# Patient Record
Sex: MALE | Race: WHITE | NOT HISPANIC OR LATINO | Employment: OTHER | URBAN - METROPOLITAN AREA
[De-identification: names, ages, dates, MRNs, and addresses within clinical notes are randomized per-mention and may not be internally consistent; named-entity substitution may affect disease eponyms.]

---

## 2024-02-20 ENCOUNTER — TELEPHONE (OUTPATIENT)
Dept: PRIMARY CARE | Facility: CLINIC | Age: 74
End: 2024-02-20

## 2024-02-20 NOTE — TELEPHONE ENCOUNTER
Patient due for an MCRA at any point. Schedule with [facility] or Dr. Pimentel. Please schedule patient and send this encounter to [facility] for lab orders.       Thank you-  Eliazar Feliz CMA  2/20/2024  Practice Supervisor  [facility]

## 2025-04-10 ENCOUNTER — OFFICE VISIT (OUTPATIENT)
Dept: PRIMARY CARE | Facility: CLINIC | Age: 75
End: 2025-04-10
Payer: MEDICARE

## 2025-04-10 VITALS
OXYGEN SATURATION: 98 % | HEIGHT: 70 IN | HEART RATE: 87 BPM | BODY MASS INDEX: 22.76 KG/M2 | DIASTOLIC BLOOD PRESSURE: 98 MMHG | SYSTOLIC BLOOD PRESSURE: 150 MMHG | WEIGHT: 159 LBS | TEMPERATURE: 97.4 F

## 2025-04-10 DIAGNOSIS — R11.2 NAUSEA AND VOMITING, UNSPECIFIED VOMITING TYPE: ICD-10-CM

## 2025-04-10 DIAGNOSIS — R10.13 EPIGASTRIC PAIN: Primary | ICD-10-CM

## 2025-04-10 DIAGNOSIS — K21.9 GASTROESOPHAGEAL REFLUX DISEASE, UNSPECIFIED WHETHER ESOPHAGITIS PRESENT: ICD-10-CM

## 2025-04-10 DIAGNOSIS — R11.0 NAUSEA: ICD-10-CM

## 2025-04-10 PROCEDURE — 99214 OFFICE O/P EST MOD 30 MIN: CPT | Performed by: FAMILY MEDICINE

## 2025-04-10 PROCEDURE — 1159F MED LIST DOCD IN RCRD: CPT | Performed by: FAMILY MEDICINE

## 2025-04-10 PROCEDURE — 93000 ELECTROCARDIOGRAM COMPLETE: CPT | Performed by: FAMILY MEDICINE

## 2025-04-10 PROCEDURE — 1036F TOBACCO NON-USER: CPT | Performed by: FAMILY MEDICINE

## 2025-04-10 RX ORDER — OMEPRAZOLE 20 MG/1
20 CAPSULE, DELAYED RELEASE ORAL
COMMUNITY

## 2025-04-10 ASSESSMENT — ENCOUNTER SYMPTOMS
COUGH: 0
BLOOD IN STOOL: 0
DEPRESSION: 0
OCCASIONAL FEELINGS OF UNSTEADINESS: 0
APPETITE CHANGE: 1
DIARRHEA: 0
SINUS PRESSURE: 0
APNEA: 0
DIFFICULTY URINATING: 0
UNEXPECTED WEIGHT CHANGE: 0
SORE THROAT: 0
LOSS OF SENSATION IN FEET: 0
POLYPHAGIA: 0
ACTIVITY CHANGE: 0
ABDOMINAL PAIN: 1
FACIAL SWELLING: 0
EYE REDNESS: 0
ABDOMINAL DISTENTION: 0
STRIDOR: 0
DIAPHORESIS: 0
ARTHRALGIAS: 0
EYE ITCHING: 0
FLANK PAIN: 0

## 2025-04-10 ASSESSMENT — PATIENT HEALTH QUESTIONNAIRE - PHQ9
10. IF YOU CHECKED OFF ANY PROBLEMS, HOW DIFFICULT HAVE THESE PROBLEMS MADE IT FOR YOU TO DO YOUR WORK, TAKE CARE OF THINGS AT HOME, OR GET ALONG WITH OTHER PEOPLE: VERY DIFFICULT
2. FEELING DOWN, DEPRESSED OR HOPELESS: SEVERAL DAYS
1. LITTLE INTEREST OR PLEASURE IN DOING THINGS: SEVERAL DAYS
SUM OF ALL RESPONSES TO PHQ9 QUESTIONS 1 AND 2: 2

## 2025-04-10 NOTE — PATIENT INSTRUCTIONS
Epigastric pain intractable nausea vomiting.  Referring to ER for evaluation concerned about possible ulcer possible obstruction possible pancreatitis.    I have commended following up with GI specialist.  This was recommended 2 years ago.  Will likely need to have them see you at the hospital.    EKG performed and reviewed no acute abnormalities noted.  Will notify ER of you coming over.    Reviewed EKG with you today.    Going to have you go to the emergency room for evaluation and will have your wife drive you.    I did reach out to the ER.

## 2025-04-10 NOTE — PROGRESS NOTES
Subjective   Patient ID: Lloyd Mixon is a 74 y.o. male who presents for Abdominal Pain (X 1 week ; nausea).    Patient presents having troubles with persistent nausea and epigastric discomfort.    Patient states this started about 6 to 7 days ago.  There is been no hematemesis but has had several bouts of emesis.  Has not been able to eat much occasionally can have some eggs and things of that nature.  Patient having water in may keep it down for about 2 hours or so and then may get back up.    He has had history of GE reflux.  States he is getting some heartburn now.    Patient with no diarrhea no blood in the stool or black tarry stool.    Patient has not episode of diverticulitis in the past he was to follow-up with GI specialist 2 years ago but he did not do so.  States he got busy with traveling and he is avoiding having any scoping performed.    Attending.  Patient has been contacted to follow-up with his visits.  Also contacted by to [facility] to follow-up on testing but he did not want to do this.            Abdominal Pain  Pertinent negatives include no arthralgias or diarrhea.    patient having issues of abdominal pain and nausea over the last week.    Review of Systems   Constitutional:  Positive for appetite change. Negative for activity change, diaphoresis and unexpected weight change.   HENT:  Negative for congestion, ear discharge, facial swelling, postnasal drip, sinus pressure and sore throat.    Eyes:  Negative for redness and itching.   Respiratory:  Negative for apnea, cough and stridor.    Cardiovascular:  Negative for leg swelling.   Gastrointestinal:  Positive for abdominal pain. Negative for abdominal distention, blood in stool and diarrhea.   Endocrine: Negative for heat intolerance and polyphagia.   Genitourinary:  Negative for difficulty urinating, flank pain and penile swelling.   Musculoskeletal:  Negative for arthralgias.       Objective   BP (!) 150/98   Pulse 87   Temp  "36.3 °C (97.4 °F)   Ht 1.765 m (5' 9.5\")   Wt 72.1 kg (159 lb)   SpO2 98%   BMI 23.14 kg/m²   BSA Body surface area is 1.88 meters squared.      Physical Exam  Constitutional:       Appearance: Normal appearance.   HENT:      Head: Normocephalic and atraumatic.      Right Ear: Tympanic membrane normal.      Left Ear: Tympanic membrane normal.      Nose: Nose normal.      Mouth/Throat:      Mouth: Mucous membranes are dry.   Eyes:      Comments: No jaundice noted   Cardiovascular:      Rate and Rhythm: Normal rate and regular rhythm.      Pulses: Normal pulses.      Heart sounds: Normal heart sounds.   Pulmonary:      Effort: Pulmonary effort is normal.      Breath sounds: Normal breath sounds.   Abdominal:      General: Abdomen is flat.      Tenderness: There is no abdominal tenderness. There is no guarding.      Comments: Some epigastric tenderness with deep palpation.   Musculoskeletal:      Cervical back: No rigidity.   Neurological:      Mental Status: He is alert.       No visits with results within 1 Year(s) from this visit.   Latest known visit with results is:   Legacy Encounter on 04/14/2022   Component Date Value Ref Range Status    Prostate Specific Antigen,Screen 04/14/2022 2.29  0.00 - 4.00 ng/mL Final    Comment: The FDA requires that the method used for PSA assay be   reported to the physician. Values obtained with different   assay methods must not be used interchangeably. This test   was performed at [facility] using the Siemens  [employer] PSA method, which is a sandwich immunoassay using   chemiluminescence for quantitation. The assay is approved  for measurement of prostate-specific antigen (PSA) in   serum and may be used in conjunction with a digital rectal  examination in men 50 years and older as an aid in   detection of prostate cancer.   5-Alpha-reductase inhibitors (e.g. Proscar, Finasteride,   Avodart, Dutasteride and Cortney) for the treatment of BPH   have been shown to " lower PSA levels by an average of 50%   after 6 months of treatment.      CRP 04/14/2022 0.35  mg/dL Final    Comment: REF VALUE  < 1.00      Cholesterol 04/14/2022 268 (H)  0 - 199 mg/dL Final    Comment: .      AGE      DESIRABLE   BORDERLINE HIGH   HIGH     0-19 Y     0 - 169       170 - 199     >/= 200    20-24 Y     0 - 189       190 - 224     >/= 225         >24 Y     0 - 199       200 - 239     >/= 240   **All ranges are based on fasting samples. Specific   therapeutic targets will vary based on patient-specific   cardiac risk.  .   Pediatric guidelines reference:Pediatrics 2011, 128(S5).   Adult guidelines reference: NCEP ATPIII Guidelines,     CHIP 2001, 258:2486-97  .   Venipuncture immediately after or during the    administration of Metamizole may lead to falsely   low results. Testing should be performed immediately   prior to Metamizole dosing.      HDL 04/14/2022 43.5  mg/dL Final    Comment: .      AGE      VERY LOW   LOW     NORMAL    HIGH       0-19 Y       < 35   < 40     40-45     ----    20-24 Y       ----   < 40       >45     ----      >24 Y       ----   < 40     40-60      >60  .      Cholesterol/HDL Ratio 04/14/2022 6.2 (A)   Final    Comment: REF VALUES  DESIRABLE  < 3.4  HIGH RISK  > 5.0      LDL 04/14/2022 176 (H)  0 - 99 mg/dL Final    Comment: .                           NEAR      BORD      AGE      DESIRABLE  OPTIMAL    HIGH     HIGH     VERY HIGH     0-19 Y     0 - 109     ---    110-129   >/= 130     ----    20-24 Y     0 - 119     ---    120-159   >/= 160     ----      >24 Y     0 -  99   100-129  130-159   160-189     >/=190  .      VLDL 04/14/2022 48 (H)  0 - 40 mg/dL Final    Triglycerides 04/14/2022 242 (H)  0 - 149 mg/dL Final    Comment: .      AGE      DESIRABLE   BORDERLINE HIGH   HIGH     VERY HIGH   0 D-90 D    19 - 174         ----         ----        ----  91 D- 9 Y     0 -  74        75 -  99     >/= 100      ----    10-19 Y     0 -  89        90 - 129     >/= 130       ----    20-24 Y     0 - 114       115 - 149     >/= 150      ----         >24 Y     0 - 149       150 - 199    200- 499    >/= 500  .   Venipuncture immediately after or during the    administration of Metamizole may lead to falsely   low results. Testing should be performed immediately   prior to Metamizole dosing.      Non HDL Cholesterol 04/14/2022 225  mg/dL Final    Comment:     AGE      DESIRABLE   BORDERLINE HIGH   HIGH     VERY HIGH     0-19 Y     0 - 119       120 - 144     >/= 145    >/= 160    20-24 Y     0 - 149       150 - 189     >/= 190      ----         >24 Y    30 MG/DL ABOVE LDL CHOLESTEROL GOAL  .      WBC 04/14/2022 6.6  4.4 - 11.3 x10E9/L Final    nRBC 04/14/2022 0.0  0.0 - 0.0 /100 WBC Final    RBC 04/14/2022 5.22  4.50 - 5.90 x10E12/L Final    Hemoglobin 04/14/2022 16.0  13.5 - 17.5 g/dL Final    Hematocrit 04/14/2022 47.4  41.0 - 52.0 % Final    MCV 04/14/2022 91  80 - 100 fL Final    MCHC 04/14/2022 33.8  32.0 - 36.0 g/dL Final    Platelets 04/14/2022 175  150 - 450 x10E9/L Final    RDW 04/14/2022 13.2  11.5 - 14.5 % Final    Neutrophils % 04/14/2022 62.5  40.0 - 80.0 % Final    Immature Granulocytes %, Automated 04/14/2022 0.3  0.0 - 0.9 % Final    Comment:  Immature Granulocyte Count (IG) includes promyelocytes,    myelocytes and metamyelocytes but does not include bands.   Percent differential counts (%) should be interpreted in the   context of the absolute cell counts (cells/L).      Lymphocytes % 04/14/2022 25.8  13.0 - 44.0 % Final    Monocytes % 04/14/2022 7.5  2.0 - 10.0 % Final    Eosinophils % 04/14/2022 3.1  0.0 - 6.0 % Final    Basophils % 04/14/2022 0.8  0.0 - 2.0 % Final    Neutrophils Absolute 04/14/2022 4.10  1.60 - 5.50 x10E9/L Final    Lymphocytes Absolute 04/14/2022 1.69  0.80 - 3.00 x10E9/L Final    Monocytes Absolute 04/14/2022 0.49  0.05 - 0.80 x10E9/L Final    Eosinophils Absolute 04/14/2022 0.20  0.00 - 0.40 x10E9/L Final    Basophils Absolute 04/14/2022 0.05  0.00 -  0.10 x10E9/L Final    Glucose 04/14/2022 102 (H)  74 - 99 mg/dL Final    Sodium 04/14/2022 140  136 - 145 mmol/L Final    Potassium 04/14/2022 4.1  3.5 - 5.3 mmol/L Final    Chloride 04/14/2022 103  98 - 107 mmol/L Final    Bicarbonate 04/14/2022 29  21 - 32 mmol/L Final    Anion Gap 04/14/2022 12  10 - 20 mmol/L Final    Urea Nitrogen 04/14/2022 16  6 - 23 mg/dL Final    Creatinine 04/14/2022 1.23  0.50 - 1.30 mg/dL Final    GFR MALE 04/14/2022 62  >90 mL/min/1.73m2 Final    Comment:  CALCULATIONS OF ESTIMATED GFR ARE PERFORMED   USING THE 2021 CKD-EPI STUDY REFIT EQUATION   WITHOUT THE RACE VARIABLE FOR THE IDMS-TRACEABLE   CREATININE METHODS.    https://jasn.asnjournals.org/content/early/2021/09/22/ASN.2187937638      Calcium 04/14/2022 9.2  8.6 - 10.6 mg/dL Final    Albumin 04/14/2022 4.2  3.4 - 5.0 g/dL Final    Alkaline Phosphatase 04/14/2022 71  33 - 136 U/L Final    Total Protein 04/14/2022 6.8  6.4 - 8.2 g/dL Final    AST 04/14/2022 22  9 - 39 U/L Final    Total Bilirubin 04/14/2022 0.9  0.0 - 1.2 mg/dL Final    ALT (SGPT) 04/14/2022 44  10 - 52 U/L Final    Comment:  Patients treated with Sulfasalazine may generate    falsely decreased results for ALT.      TSH 04/14/2022 3.50  0.44 - 3.98 mIU/L Final    Comment:  TSH testing is performed using different testing    methodology at [facility] than at other    [facility] hospitals. Direct result comparisons should    only be made within the same method.       Current Outpatient Medications on File Prior to Visit   Medication Sig Dispense Refill    omeprazole (PriLOSEC) 20 mg DR capsule Take 1 capsule (20 mg) by mouth once daily.       No current facility-administered medications on file prior to visit.     No images are attached to the encounter.            Assessment/Plan   Problem List Items Addressed This Visit             ICD-10-CM    Epigastric pain - Primary R10.13    Relevant Orders    ECG 12 Lead (Completed)    Nausea and vomiting R11.2     Gastroesophageal reflux disease K21.9

## 2025-04-15 ENCOUNTER — PATIENT OUTREACH (OUTPATIENT)
Dept: PRIMARY CARE | Facility: CLINIC | Age: 75
End: 2025-04-15
Payer: MEDICARE

## 2025-04-15 RX ORDER — PREDNISONE 20 MG/1
TABLET ORAL
COMMUNITY
Start: 2025-04-14 | End: 2025-05-19

## 2025-04-15 RX ORDER — PANTOPRAZOLE SODIUM 40 MG/1
40 TABLET, DELAYED RELEASE ORAL 2 TIMES DAILY
COMMUNITY
Start: 2025-04-14

## 2025-04-15 RX ORDER — POLYETHYLENE GLYCOL 3350 17 G/17G
17 POWDER, FOR SOLUTION ORAL
COMMUNITY
Start: 2025-04-14 | End: 2025-04-28

## 2025-04-15 RX ORDER — LOSARTAN POTASSIUM 25 MG/1
1 TABLET ORAL
COMMUNITY
Start: 2025-04-14

## 2025-04-15 NOTE — PROGRESS NOTES
Discharge Facility: [facility]   Discharge Diagnosis: Duodenitis   Admission Date: 4/11/2025   Discharge Date: 4/14/2025    PCP Appointment Date: TBD office to arrange fup with PCP as needed  Specialist Appointment Date:   Emre Granado [phone]    Brandon Sheridan [phone]    Hospital Encounter and Summary Linked: Yes  Hospital Encounter    See discharge assessment below for further details    Wrap Up  Wrap Up Additional Comments: Successful outreach to the patient has been completed. The patient reports feeling well at home since discharge and denies experiencing any chest pain, abdominal pain/N/V during outreach. Patient states he was provided with the heart monitor before his left the hospital with expected fup with cardiology to follow. We reviewed their new medications and any changes made. The patient confirmed that they have all the necessary supplies and resources at home, and they also have support from family and friends if needed. I emphasized the importance of follow-up appointments with both their primary care physician and specialists to assess treatment response. The patient is aware of my availability for non-emergency concerns and has been provided with my contact information. (4/15/2025 10:51 AM)    Engagement  Call Start Time: 1038 (4/15/2025 10:51 AM)    Medications  Medications reviewed with patient/caregiver?: Yes (new meds discussed with patient) (4/15/2025 10:51 AM)  Is the patient having any side effects they believe may be caused by any medication additions or changes?: No (4/15/2025 10:51 AM)  Does the patient have all medications ordered at discharge?: Yes (4/15/2025 10:51 AM)  Care Management Interventions: No intervention needed (4/15/2025 10:51 AM)  Prescription Comments: see md list (pantoprazole; miralax; prednisone) (4/15/2025 10:51 AM)  Is the patient taking all medications as directed (includes completed medication regime)?: Yes (4/15/2025 10:51  AM)    Appointments  Does the patient have a primary care provider?: Yes (4/15/2025 10:51 AM)  Care Management Interventions: Educated patient on importance of making appointment; Advised patient to make appointment (4/15/2025 10:51 AM)  Has the patient kept scheduled appointments due by today?: Yes (4/15/2025 10:51 AM)  Care Management Interventions: Advised to schedule with specialist (4/15/2025 10:51 AM)    Self Management  What is the home health agency?: denies need (4/15/2025 10:51 AM)  What Durable Medical Equipment (DME) was ordered?: n/a (4/15/2025 10:51 AM)    Patient Teaching  Does the patient have access to their discharge instructions?: Yes (4/15/2025 10:51 AM)  Care Management Interventions: Reviewed instructions with patient (4/15/2025 10:51 AM)  What is the patient's perception of their health status since discharge?: Improving (4/15/2025 10:51 AM)  Is the patient/caregiver able to teach back the hierarchy of who to call/visit for symptoms/problems? PCP, Specialist, Home Health nurse, Urgent Care, ED, 911: Yes (4/15/2025 10:51 AM)

## 2025-04-18 ENCOUNTER — OFFICE VISIT (OUTPATIENT)
Dept: PRIMARY CARE | Facility: CLINIC | Age: 75
End: 2025-04-18
Payer: MEDICARE

## 2025-04-18 VITALS
TEMPERATURE: 97.7 F | SYSTOLIC BLOOD PRESSURE: 124 MMHG | OXYGEN SATURATION: 99 % | WEIGHT: 162.2 LBS | HEART RATE: 68 BPM | DIASTOLIC BLOOD PRESSURE: 78 MMHG | BODY MASS INDEX: 23.61 KG/M2

## 2025-04-18 DIAGNOSIS — I10 PRIMARY HYPERTENSION: ICD-10-CM

## 2025-04-18 DIAGNOSIS — R00.1 BRADYCARDIA: ICD-10-CM

## 2025-04-18 DIAGNOSIS — K29.00 ACUTE GASTRITIS WITHOUT HEMORRHAGE, UNSPECIFIED GASTRITIS TYPE: ICD-10-CM

## 2025-04-18 DIAGNOSIS — K29.80 DUODENITIS: Primary | ICD-10-CM

## 2025-04-18 PROCEDURE — 99496 TRANSJ CARE MGMT HIGH F2F 7D: CPT | Performed by: FAMILY MEDICINE

## 2025-04-18 PROCEDURE — 1159F MED LIST DOCD IN RCRD: CPT | Performed by: FAMILY MEDICINE

## 2025-04-18 PROCEDURE — 3074F SYST BP LT 130 MM HG: CPT | Performed by: FAMILY MEDICINE

## 2025-04-18 PROCEDURE — 1036F TOBACCO NON-USER: CPT | Performed by: FAMILY MEDICINE

## 2025-04-18 PROCEDURE — 3078F DIAST BP <80 MM HG: CPT | Performed by: FAMILY MEDICINE

## 2025-04-18 ASSESSMENT — ENCOUNTER SYMPTOMS
APPETITE CHANGE: 0
CONSTIPATION: 0
CHILLS: 0
EYE DISCHARGE: 0
FACIAL SWELLING: 0
WHEEZING: 0
FEVER: 0
DIARRHEA: 0
COUGH: 0
PALPITATIONS: 0
COLOR CHANGE: 0
ACTIVITY CHANGE: 0
CONFUSION: 0
ARTHRALGIAS: 0

## 2025-04-18 NOTE — PROGRESS NOTES
"Patient: Lloyd Mixon  : 1950  PCP: Casey Pimentel DO  MRN: [id number]  Program: No linked episodes     Lloyd Mixon is a 74 y.o. male presenting today for follow-up after being discharged from the hospital 5 days ago. The main problem requiring admission was duodenitis. The discharge summary and/or Transitional Care Management documentation was reviewed. Medication reconciliation was performed as indicated via the \"Kei as Reviewed\" timestamp.     Lloyd Mixon was contacted by Transitional Care Management services two days after his discharge. This encounter and supporting documentation was reviewed.    The complexity of medical decision making for this patient's transitional care is moderate.    This discharge summary is copied in its entirety and is as follows:    DISCHARGE SUMMARY     PATIENT NAME: Lloyd Mixon Code Status: Full Code   MRN: [id number]     Highest Readmission Risk Score: 10   The 30 day readmissions risk score is derived from an internally validated risk model which evaluates patient level characteristics, utilization history, medication orders and lab results up until the day of discharge. Patients with a score of 39 or above are considered highest risk for readmission. Specific patient level drivers will be listed at the bottom of the summary.    Admission Information     Admission Information   ADMIT DATE: 2025  DISCHARGE DATE: 2025    MY DOCTORS AND MEDICAL TEAM:  My Main Hospital Doctor: Giana Hernandez DO  Primary Care Provider: Casey Pimentel MD  My Medical Team Members: Treatment Team:   Attending Provider: Giana Hernandez DO  Consulting: Clovis Stephenson MD  Primary Service: [place] [employer]  Consulting: Luke Sanchez MD    MY CONDITION AT DISCHARGE: Stable    SUMMARY OF WHAT HAPPENED WHILE I WAS IN THE HOSPITAL: This is a very pleasant 74 year old male with PMH of GERD, hypertension, who presents with nausea and non-bloody vomiting along with epigastric " pain for the past 10 days. Patient has been unable to tolerate any oral intake. He was seen in the ER and had CT abdomen with contrast was done which shows marked thickening of proximal duodenal wall with adjacent stranding suggesting duodenitis, distended stomach with fluid-filled distal esophagus concerning for partial obstruction. Patient is being admitted for further management and GI consultation. He underwent EGD on 4/11 showing Esophageal mucosal changes suggestive of eosinophilic esophagitis. Biopsied. Acute gastritis, characterized by nodularity. Biopsied. Acute duodenitis, characterized by congestion (edema), erythema, friability, granularity, mucus, nodularity and aphthous ulcerations. Biopsied. GI recommends prednisone 40 mg daily x 2 weeks followed by a taper, protonix BID and repeat EGD in one month. He will also need to follow up for biopsy results. He was noted to have sinus bradycardia as well as HTN. He had stopped taking blood pressure medication at home. This is being restarted. Cardiology recommended home with heart monitor and outpatient echocardiogram and stress testing. These have been ordered for questions, concerns and results, He will follow up with his PCP. He was tolerating a diet and feeling improved. He is discharged home to follow up with his PCP, and GI, as well as cardiology if needed.         OTHER PROBLEMS/DIAGNOSIS:  Principal Problem:  Intractable nausea and vomiting  Active Problems:  Nicotine use disorder, F17.2  Sinus bradycardia  Resolved Problems:  * No resolved hospital problems. *    OPERATIONS PERFORMED WHILE IN THE HOSPITAL: None    IMPORTANT TEST/PROCEDURES:   EGD    TEST RESULTS NOT AVAILABLE AT THIS TIME:   The plan for following up on pending results Hgb A1c, biopsy results is follow up with family doctor and gastroenterology     Discharge Disposition   Discharge Disposition: Home With Self Care         Activity When You Leave the Hospital     Resume pre-hospital  activity         Diet Instructions     Resume your pre-hospital diet         Call Your Doctor If     You have a severe headache   You have lightheadedness, fainting, or confusion   You have persistent nausea/vomiting over 24 hours   You have persistent or heavy bleeding   You have swollen glands or cold and clammy skin   Your temperature is greater than 101F         Follow Up Appointments     Follow-up Appointment   When: In 5 days   Casey Pimentel MD   [phone]   [facility]  [address]  [facility], [place]  Count includes the [facility] [address]     PCP Requested Referral   Follow-up Appointment   When: In 3 weeks   Emre Granado MD   [phone]   [address] [address]  [facility] [address]     PCP Requested Referral   Follow-up Appointment   When: In: Comment - If needed pending testing results   Brandon Sheridan MD   [phone]   [address]  [facility] [address]     PCP Requested Referral         Additional Provider to Provider Information:     FOLLOW-UP APPOINTMENTS ALREADY SCHEDULED WITH A [facility] PROVIDER:  No future appointments.    ALLERGIES  No Known Allergies    DISCHARGE MEDICATION:     Medication List       START taking these medications     pantoprazole DR 40 mg tablet  Commonly known as: PROTONIX  Take 1 tablet by mouth two times a day.    polyethylene glycol 3350 17 gram packet  Take 1 packet by mouth once daily for 14 doses. Dissolve dose in 4 - 8 ounces of liquid and take as directed.    predniSONE 20 mg tablet  Commonly known as: DELTASONE  Take 2 tablets by mouth once daily for 14 days, THEN 1.5 tablets once daily for 7 days, THEN 1 tablet once daily for 7 days, THEN 0.5 tablets once daily for 7 days.  Start taking on: April 14, 2025          CONTINUE taking these medications     losartan 25 mg tablet  Commonly known as: COZAAR  Take 1 tablet by mouth once daily.          STOP taking these medications     omeprazole 20 mg  "capsule  Commonly known as: PriLOSEC    VIVOTIF AMBAR VACCINE ORAL            Where to Get Your Medications       These medications were sent to [id number] [address] - [address] - [phone] [address] [place] [address]     Phone: [phone]   losartan 25 mg tablet  pantoprazole DR 40 mg tablet  polyethylene glycol 3350 17 gram packet  predniSONE 20 mg tablet      Discharge Physical Exam:  VITAL SIGNS: /83  Pulse (!) 50  Temp 36.4 °C (97.6 °F) (Oral)  Resp 17  Ht 177.8 cm (5' 10\")  Wt 71.6 kg (157 lb 13.6 oz)  SpO2 98%  BMI 22.65 kg/m²   GENERAL: Alert, no distress, cooperative    Patient seen at bedside. Tolerating diet. No new complaint. Reviewed BP with patient agreeable to restart anti-HTN and will do BP monitoring at home. Spoke to GI rec steroids 40 mg x 2 weeks then taper by 10 mg weekly with close pcp follow up and gi in one month. [facility]     Additional Information     Additional Health Information I Need to Know After I Leave the Hospital   Please check your blood pressure at home and record the results. Share them with your regular doctor.  Please follow up with your primary care physician and discuss your medications as they may need to be adjusted.     Review of Systems   Constitutional:  Negative for activity change, appetite change, chills and fever.   HENT:  Negative for congestion, ear pain and facial swelling.    Eyes:  Negative for discharge.   Respiratory:  Negative for cough and wheezing.    Cardiovascular:  Negative for chest pain, palpitations and leg swelling.   Gastrointestinal:  Negative for constipation and diarrhea.   Musculoskeletal:  Negative for arthralgias.   Skin:  Negative for color change and pallor.   Neurological:  Negative for syncope.   Psychiatric/Behavioral:  Negative for confusion.        Family History[1]    No data recorded    No follow-ups " on file.    Objective   /78 (BP Location: Left arm, Patient Position: Sitting)   Pulse 68   Temp 36.5 °C (97.7 °F)   Wt 73.6 kg (162 lb 3.2 oz)   SpO2 99%   BMI 23.61 kg/m²   BSA Body surface area is 1.9 meters squared.    Physical Exam  Constitutional:       General: He is not in acute distress.     Appearance: Normal appearance. He is not toxic-appearing.   HENT:      Head: Normocephalic.      Right Ear: Tympanic membrane, ear canal and external ear normal.      Left Ear: Tympanic membrane, ear canal and external ear normal.   Eyes:      Conjunctiva/sclera: Conjunctivae normal.      Pupils: Pupils are equal, round, and reactive to light.   Cardiovascular:      Rate and Rhythm: Normal rate and regular rhythm.      Pulses: Normal pulses.      Heart sounds: Normal heart sounds.   Pulmonary:      Effort: No respiratory distress.      Breath sounds: No wheezing, rhonchi or rales.   Abdominal:      General: Bowel sounds are normal. There is no distension.      Palpations: Abdomen is soft.      Tenderness: There is no abdominal tenderness.   Musculoskeletal:         General: No swelling or tenderness.   Skin:     Findings: No lesion or rash.   Neurological:      General: No focal deficit present.      Mental Status: He is alert and oriented to person, place, and time. Mental status is at baseline.      Gait: Gait normal.   Psychiatric:         Mood and Affect: Mood normal.         Behavior: Behavior normal.         Thought Content: Thought content normal.         Judgment: Judgment normal.       No visits with results within 1 Year(s) from this visit.   Latest known visit with results is:   Legacy Encounter on 04/14/2022   Component Date Value Ref Range Status    Prostate Specific Antigen,Screen 04/14/2022 2.29  0.00 - 4.00 ng/mL Final    Comment: The FDA requires that the method used for PSA assay be   reported to the physician. Values obtained with different   assay methods must not be used interchangeably.  This test   was performed at [facility] using the Siemens  ZiftitllBilibot PSA method, which is a sandwich immunoassay using   chemiluminescence for quantitation. The assay is approved  for measurement of prostate-specific antigen (PSA) in   serum and may be used in conjunction with a digital rectal  examination in men 50 years and older as an aid in   detection of prostate cancer.   5-Alpha-reductase inhibitors (e.g. Proscar, Finasteride,   Avodart, Dutasteride and Cortney) for the treatment of BPH   have been shown to lower PSA levels by an average of 50%   after 6 months of treatment.      CRP 04/14/2022 0.35  mg/dL Final    Comment: REF VALUE  < 1.00      Cholesterol 04/14/2022 268 (H)  0 - 199 mg/dL Final    Comment: .      AGE      DESIRABLE   BORDERLINE HIGH   HIGH     0-19 Y     0 - 169       170 - 199     >/= 200    20-24 Y     0 - 189       190 - 224     >/= 225         >24 Y     0 - 199       200 - 239     >/= 240   **All ranges are based on fasting samples. Specific   therapeutic targets will vary based on patient-specific   cardiac risk.  .   Pediatric guidelines reference:Pediatrics 2011, 128(S5).   Adult guidelines reference: NCEP ATPIII Guidelines,     CHIP 2001, 258:2486-97  .   Venipuncture immediately after or during the    administration of Metamizole may lead to falsely   low results. Testing should be performed immediately   prior to Metamizole dosing.      HDL 04/14/2022 43.5  mg/dL Final    Comment: .      AGE      VERY LOW   LOW     NORMAL    HIGH       0-19 Y       < 35   < 40     40-45     ----    20-24 Y       ----   < 40       >45     ----      >24 Y       ----   < 40     40-60      >60  .      Cholesterol/HDL Ratio 04/14/2022 6.2 (A)   Final    Comment: REF VALUES  DESIRABLE  < 3.4  HIGH RISK  > 5.0      LDL 04/14/2022 176 (H)  0 - 99 mg/dL Final    Comment: .                           NEAR      BORD      AGE      DESIRABLE  OPTIMAL    HIGH     HIGH     VERY HIGH     0-19 Y      0 - 109     ---    110-129   >/= 130     ----    20-24 Y     0 - 119     ---    120-159   >/= 160     ----      >24 Y     0 -  99   100-129  130-159   160-189     >/=190  .      VLDL 04/14/2022 48 (H)  0 - 40 mg/dL Final    Triglycerides 04/14/2022 242 (H)  0 - 149 mg/dL Final    Comment: .      AGE      DESIRABLE   BORDERLINE HIGH   HIGH     VERY HIGH   0 D-90 D    19 - 174         ----         ----        ----  91 D- 9 Y     0 -  74        75 -  99     >/= 100      ----    10-19 Y     0 -  89        90 - 129     >/= 130      ----    20-24 Y     0 - 114       115 - 149     >/= 150      ----         >24 Y     0 - 149       150 - 199    200- 499    >/= 500  .   Venipuncture immediately after or during the    administration of Metamizole may lead to falsely   low results. Testing should be performed immediately   prior to Metamizole dosing.      Non HDL Cholesterol 04/14/2022 225  mg/dL Final    Comment:     AGE      DESIRABLE   BORDERLINE HIGH   HIGH     VERY HIGH     0-19 Y     0 - 119       120 - 144     >/= 145    >/= 160    20-24 Y     0 - 149       150 - 189     >/= 190      ----         >24 Y    30 MG/DL ABOVE LDL CHOLESTEROL GOAL  .      WBC 04/14/2022 6.6  4.4 - 11.3 x10E9/L Final    nRBC 04/14/2022 0.0  0.0 - 0.0 /100 WBC Final    RBC 04/14/2022 5.22  4.50 - 5.90 x10E12/L Final    Hemoglobin 04/14/2022 16.0  13.5 - 17.5 g/dL Final    Hematocrit 04/14/2022 47.4  41.0 - 52.0 % Final    MCV 04/14/2022 91  80 - 100 fL Final    MCHC 04/14/2022 33.8  32.0 - 36.0 g/dL Final    Platelets 04/14/2022 175  150 - 450 x10E9/L Final    RDW 04/14/2022 13.2  11.5 - 14.5 % Final    Neutrophils % 04/14/2022 62.5  40.0 - 80.0 % Final    Immature Granulocytes %, Automated 04/14/2022 0.3  0.0 - 0.9 % Final    Comment:  Immature Granulocyte Count (IG) includes promyelocytes,    myelocytes and metamyelocytes but does not include bands.   Percent differential counts (%) should be interpreted in the   context of the absolute cell  counts (cells/L).      Lymphocytes % 04/14/2022 25.8  13.0 - 44.0 % Final    Monocytes % 04/14/2022 7.5  2.0 - 10.0 % Final    Eosinophils % 04/14/2022 3.1  0.0 - 6.0 % Final    Basophils % 04/14/2022 0.8  0.0 - 2.0 % Final    Neutrophils Absolute 04/14/2022 4.10  1.60 - 5.50 x10E9/L Final    Lymphocytes Absolute 04/14/2022 1.69  0.80 - 3.00 x10E9/L Final    Monocytes Absolute 04/14/2022 0.49  0.05 - 0.80 x10E9/L Final    Eosinophils Absolute 04/14/2022 0.20  0.00 - 0.40 x10E9/L Final    Basophils Absolute 04/14/2022 0.05  0.00 - 0.10 x10E9/L Final    Glucose 04/14/2022 102 (H)  74 - 99 mg/dL Final    Sodium 04/14/2022 140  136 - 145 mmol/L Final    Potassium 04/14/2022 4.1  3.5 - 5.3 mmol/L Final    Chloride 04/14/2022 103  98 - 107 mmol/L Final    Bicarbonate 04/14/2022 29  21 - 32 mmol/L Final    Anion Gap 04/14/2022 12  10 - 20 mmol/L Final    Urea Nitrogen 04/14/2022 16  6 - 23 mg/dL Final    Creatinine 04/14/2022 1.23  0.50 - 1.30 mg/dL Final    GFR MALE 04/14/2022 62  >90 mL/min/1.73m2 Final    Comment:  CALCULATIONS OF ESTIMATED GFR ARE PERFORMED   USING THE 2021 CKD-EPI STUDY REFIT EQUATION   WITHOUT THE RACE VARIABLE FOR THE IDMS-TRACEABLE   CREATININE METHODS.    https://jasn.asnjournals.org/content/early/2021/09/22/ASN.3326839027      Calcium 04/14/2022 9.2  8.6 - 10.6 mg/dL Final    Albumin 04/14/2022 4.2  3.4 - 5.0 g/dL Final    Alkaline Phosphatase 04/14/2022 71  33 - 136 U/L Final    Total Protein 04/14/2022 6.8  6.4 - 8.2 g/dL Final    AST 04/14/2022 22  9 - 39 U/L Final    Total Bilirubin 04/14/2022 0.9  0.0 - 1.2 mg/dL Final    ALT (SGPT) 04/14/2022 44  10 - 52 U/L Final    Comment:  Patients treated with Sulfasalazine may generate    falsely decreased results for ALT.      TSH 04/14/2022 3.50  0.44 - 3.98 mIU/L Final    Comment:  TSH testing is performed using different testing    methodology at [facility] than at other    [facility] hospitals. Direct result comparisons should    only  be made within the same method.       Medications Ordered Prior to Encounter[2]  No images are attached to the encounter.            Assessment/Plan   Diagnoses and all orders for this visit:  Duodenitis  Acute gastritis without hemorrhage, unspecified gastritis type  Primary hypertension    1.  Patient to continue to monitor blood pressures at home  2.  Patient to follow-up with cardiology Dr. Sheridan, needs heart monitor echo and stress test  3.  Patient will follow-up with GI  4.  Patient to call if questions or concerns             [1] No family history on file.  [2]   Current Outpatient Medications on File Prior to Visit   Medication Sig Dispense Refill    losartan (Cozaar) 25 mg tablet Take 1 tablet (25 mg) by mouth early in the morning..      pantoprazole (ProtoNix) 40 mg EC tablet Take 1 tablet (40 mg) by mouth twice a day.      polyethylene glycol (Glycolax, Miralax) 17 gram packet Take 17 g by mouth once daily.      predniSONE (Deltasone) 20 mg tablet Take by mouth.      [DISCONTINUED] omeprazole (PriLOSEC) 20 mg DR capsule Take 1 capsule (20 mg) by mouth once daily. (Patient not taking: Reported on 4/15/2025)       No current facility-administered medications on file prior to visit.

## 2025-04-23 ENCOUNTER — PATIENT OUTREACH (OUTPATIENT)
Dept: PRIMARY CARE | Facility: CLINIC | Age: 75
End: 2025-04-23
Payer: MEDICARE

## 2025-05-03 ASSESSMENT — ENCOUNTER SYMPTOMS
LOSS OF MOTION: 1
TINGLING: 0
INABILITY TO BEAR WEIGHT: 0

## 2025-05-08 ENCOUNTER — APPOINTMENT (OUTPATIENT)
Dept: PRIMARY CARE | Facility: CLINIC | Age: 75
End: 2025-05-08
Payer: MEDICARE

## 2025-05-08 VITALS
HEART RATE: 74 BPM | OXYGEN SATURATION: 97 % | WEIGHT: 162 LBS | BODY MASS INDEX: 23.19 KG/M2 | DIASTOLIC BLOOD PRESSURE: 88 MMHG | TEMPERATURE: 97.8 F | SYSTOLIC BLOOD PRESSURE: 126 MMHG | HEIGHT: 70 IN

## 2025-05-08 DIAGNOSIS — F17.210 CIGARETTE NICOTINE DEPENDENCE WITHOUT COMPLICATION: ICD-10-CM

## 2025-05-08 DIAGNOSIS — Z00.00 ROUTINE GENERAL MEDICAL EXAMINATION AT HEALTH CARE FACILITY: ICD-10-CM

## 2025-05-08 DIAGNOSIS — K21.00 GASTROESOPHAGEAL REFLUX DISEASE WITH ESOPHAGITIS, UNSPECIFIED WHETHER HEMORRHAGE: ICD-10-CM

## 2025-05-08 DIAGNOSIS — R10.13 EPIGASTRIC PAIN: ICD-10-CM

## 2025-05-08 DIAGNOSIS — F17.200 TOBACCO DEPENDENCY: ICD-10-CM

## 2025-05-08 DIAGNOSIS — K20.0 ALLERGIC EOSINOPHILIC ESOPHAGITIS: ICD-10-CM

## 2025-05-08 DIAGNOSIS — I48.92 ATRIAL FLUTTER, UNSPECIFIED TYPE (MULTI): ICD-10-CM

## 2025-05-08 DIAGNOSIS — S93.499S: ICD-10-CM

## 2025-05-08 DIAGNOSIS — K29.80 DUODENITIS: ICD-10-CM

## 2025-05-08 DIAGNOSIS — F17.200 CURRENT SMOKER: ICD-10-CM

## 2025-05-08 DIAGNOSIS — Z71.89 ADVANCE DIRECTIVE DISCUSSED WITH PATIENT: ICD-10-CM

## 2025-05-08 DIAGNOSIS — Z00.00 ENCOUNTER FOR ANNUAL WELLNESS VISIT (AWV) IN MEDICARE PATIENT: Primary | ICD-10-CM

## 2025-05-08 PROBLEM — S93.499A SPRAIN OF ANTERIOR TALOFIBULAR LIGAMENT: Status: ACTIVE | Noted: 2025-05-08

## 2025-05-08 PROBLEM — R11.2 NAUSEA AND VOMITING: Status: RESOLVED | Noted: 2025-04-10 | Resolved: 2025-05-08

## 2025-05-08 PROBLEM — K21.9 GASTROESOPHAGEAL REFLUX DISEASE: Status: RESOLVED | Noted: 2025-04-10 | Resolved: 2025-05-08

## 2025-05-08 PROCEDURE — 1160F RVW MEDS BY RX/DR IN RCRD: CPT | Performed by: FAMILY MEDICINE

## 2025-05-08 PROCEDURE — 1158F ADVNC CARE PLAN TLK DOCD: CPT | Performed by: FAMILY MEDICINE

## 2025-05-08 PROCEDURE — G0446 INTENS BEHAVE THER CARDIO DX: HCPCS | Performed by: FAMILY MEDICINE

## 2025-05-08 PROCEDURE — 1170F FXNL STATUS ASSESSED: CPT | Performed by: FAMILY MEDICINE

## 2025-05-08 PROCEDURE — 1123F ACP DISCUSS/DSCN MKR DOCD: CPT | Performed by: FAMILY MEDICINE

## 2025-05-08 PROCEDURE — G0439 PPPS, SUBSEQ VISIT: HCPCS | Performed by: FAMILY MEDICINE

## 2025-05-08 PROCEDURE — 99497 ADVNCD CARE PLAN 30 MIN: CPT | Performed by: FAMILY MEDICINE

## 2025-05-08 PROCEDURE — 1159F MED LIST DOCD IN RCRD: CPT | Performed by: FAMILY MEDICINE

## 2025-05-08 ASSESSMENT — ENCOUNTER SYMPTOMS
NAUSEA: 0
CHILLS: 0
ABDOMINAL DISTENTION: 0
RESPIRATORY NEGATIVE: 1
UNEXPECTED WEIGHT CHANGE: 0
CHEST TIGHTNESS: 0
SEIZURES: 0
WHEEZING: 0
SPEECH DIFFICULTY: 0
TREMORS: 0
POLYDIPSIA: 0
SLEEP DISTURBANCE: 0
ABDOMINAL PAIN: 0
EYE ITCHING: 0
CARDIOVASCULAR NEGATIVE: 1
DEPRESSION: 0
DIARRHEA: 0
NERVOUS/ANXIOUS: 0
NUMBNESS: 0
EYE PAIN: 0
LIGHT-HEADEDNESS: 0
TROUBLE SWALLOWING: 0
DIAPHORESIS: 0
WOUND: 0
OCCASIONAL FEELINGS OF UNSTEADINESS: 0
AGITATION: 0
BRUISES/BLEEDS EASILY: 0
DIFFICULTY URINATING: 0
HEADACHES: 0
COLOR CHANGE: 0
ROS GI COMMENTS: MUCH IMPROVED
VOICE CHANGE: 0
EYE REDNESS: 0
DIZZINESS: 0
NECK STIFFNESS: 0
EYE DISCHARGE: 0
VOMITING: 0
ANAL BLEEDING: 0
APPETITE CHANGE: 0
SINUS PAIN: 0
BACK PAIN: 0
SHORTNESS OF BREATH: 0
DECREASED CONCENTRATION: 0
HEMATURIA: 0
ACTIVITY CHANGE: 0
LOSS OF SENSATION IN FEET: 0
ARTHRALGIAS: 1
FREQUENCY: 0
COUGH: 0
FLANK PAIN: 0
FACIAL ASYMMETRY: 0
PALPITATIONS: 0
CONSTIPATION: 0
DYSURIA: 0
FATIGUE: 0
BLOOD IN STOOL: 0
MYALGIAS: 0
SORE THROAT: 0
ADENOPATHY: 0
SINUS PRESSURE: 0

## 2025-05-08 ASSESSMENT — ACTIVITIES OF DAILY LIVING (ADL)
TAKING_MEDICATION: INDEPENDENT
DRESSING: INDEPENDENT
GROCERY_SHOPPING: INDEPENDENT
MANAGING_FINANCES: INDEPENDENT
BATHING: INDEPENDENT
DOING_HOUSEWORK: INDEPENDENT

## 2025-05-08 ASSESSMENT — PATIENT HEALTH QUESTIONNAIRE - PHQ9
1. LITTLE INTEREST OR PLEASURE IN DOING THINGS: NOT AT ALL
10. IF YOU CHECKED OFF ANY PROBLEMS, HOW DIFFICULT HAVE THESE PROBLEMS MADE IT FOR YOU TO DO YOUR WORK, TAKE CARE OF THINGS AT HOME, OR GET ALONG WITH OTHER PEOPLE: NOT DIFFICULT AT ALL
2. FEELING DOWN, DEPRESSED OR HOPELESS: NOT AT ALL
SUM OF ALL RESPONSES TO PHQ9 QUESTIONS 1 AND 2: 0

## 2025-05-08 NOTE — PROGRESS NOTES
Advance Care Planning Note     Discussion Date: 05/08/25   Discussion Participants: patient    The patient wishes to discuss Advance Care Planning today and the following is a brief summary of our discussion.     Patient has capacity to make their own medical decisions: Yes  Health Care Agent/Surrogate Decision Maker documented in chart: Yes    Documents on file and valid:  Advance Directive/Living Will: No   Health Care Power of : No  Other: discussed and code status updated  Full code  Communication of Medical Status/Prognosis:   good    Communication of Treatment Goals/Options:   good    Treatment Decisions  Goals of Care: survival is prioritized, if goals for quality or survival can reasonably be achieved   agree  Follow Up Plan  Discuss next year  Team Members  PCP  Time Statement: Total face to face time spent on advance care planning was 16 minutes with 16 minutes spent in counseling, including the explanation.    Casey Pimentel, [employer]   Patient ID: Lloyd Mixon is a 74 y.o. male who presents for Medicare Annual Wellness Visit Subsequent.    Patient presents here for wellness check.  Patient was recently admitted for duodenitis has been referred to GI specialist will need to follow-up with that also will need his colon cancer screening.    No difficulties with headache or double vision or blurry vision no troubles with sore throat or difficulty swallowing no troubles with abdominal pain or discomfort no troubles with swelling of the legs or feet no fever no chills no night sweats    Lower Extremity Issue  Associated symptoms include arthralgias. Pertinent negatives include no abdominal pain, chest pain, chills, congestion, coughing, diaphoresis, fatigue, headaches, myalgias, nausea, numbness, rash, sore throat or vomiting. The symptoms are aggravated by movement.        Alcohol intake: 3-4 burbon a week  Caffeine intake: coffee in am  Exercise: 1/2 mile a day    Last Colonoscopy: Seeing  GI  Last Pap smear: N/A  Mammogram:N/A  Last Dexa scan:N/A    Shingles vaccine: orderd  TdaP vaccine:     Review of Systems   Constitutional:  Negative for activity change, appetite change, chills, diaphoresis, fatigue and unexpected weight change.   HENT: Negative.  Negative for congestion, dental problem, ear discharge, ear pain, hearing loss, mouth sores, nosebleeds, postnasal drip, sinus pressure, sinus pain, sore throat, tinnitus, trouble swallowing and voice change.    Eyes:  Negative for pain, discharge, redness, itching and visual disturbance.        Eye migraine   Respiratory: Negative.  Negative for cough, chest tightness, shortness of breath and wheezing.    Cardiovascular: Negative.  Negative for chest pain, palpitations and leg swelling.   Gastrointestinal:  Negative for abdominal distention, abdominal pain, anal bleeding, blood in stool, constipation, diarrhea, nausea and vomiting.        Much improved   Endocrine: Negative for cold intolerance, heat intolerance, polydipsia and polyuria.   Genitourinary:  Negative for difficulty urinating, dysuria, enuresis, flank pain, frequency, hematuria, penile discharge, scrotal swelling and urgency.   Musculoskeletal:  Positive for arthralgias. Negative for back pain, myalgias and neck stiffness.   Skin:  Negative for color change, rash and wound.   Allergic/Immunologic: Negative for environmental allergies, food allergies and immunocompromised state.   Neurological:  Negative for dizziness, tremors, seizures, facial asymmetry, speech difficulty, light-headedness, numbness and headaches.   Hematological:  Negative for adenopathy. Does not bruise/bleed easily.   Psychiatric/Behavioral:  Negative for agitation, behavioral problems, decreased concentration, sleep disturbance and suicidal ideas. The patient is not nervous/anxious.    The ASCVD Risk score (Chama DK, et al., 2019) failed to calculate for the following reasons:    Cannot find a previous HDL lab     "Cannot find a previous total cholesterol lab  Time spent was 10 mins reviewing    Objective   /88   Pulse 74   Temp 36.6 °C (97.8 °F)   Ht 1.765 m (5' 9.5\")   Wt 73.5 kg (162 lb)   SpO2 97%   BMI 23.58 kg/m²   BSA Body surface area is 1.9 meters squared.      Physical Exam  Constitutional:       General: He is not in acute distress.     Appearance: Normal appearance. He is not ill-appearing or toxic-appearing.      Comments: Obvious scars on the scalp from bur holes for subdural hematoma   HENT:      Head: Normocephalic.      Comments: Scars from bur holes     Right Ear: Tympanic membrane normal.      Left Ear: Tympanic membrane normal.      Nose: Nose normal.      Mouth/Throat:      Mouth: Mucous membranes are dry.   Eyes:      Extraocular Movements: Extraocular movements intact.      Conjunctiva/sclera: Conjunctivae normal.      Pupils: Pupils are equal, round, and reactive to light.   Cardiovascular:      Rate and Rhythm: Normal rate and regular rhythm.      Pulses: Normal pulses.      Heart sounds: Normal heart sounds.   Pulmonary:      Effort: Pulmonary effort is normal.      Breath sounds: Normal breath sounds. No wheezing or rhonchi.   Abdominal:      General: Abdomen is flat. Bowel sounds are normal. There is no distension.      Palpations: Abdomen is soft.      Tenderness: There is no abdominal tenderness. There is no right CVA tenderness or rebound.      Hernia: No hernia is present.   Genitourinary:     Penis: Normal.       Testes: Normal.   Musculoskeletal:         General: Normal range of motion.      Cervical back: Normal range of motion.      Right lower leg: No edema.      Comments: Ankle revealed some ecchymosis    Anterior posterior drawer sign intact dorsiflexion extension intact dorsalis pedis pulse are strong some bruising into the toes   Skin:     General: Skin is warm and dry.      Capillary Refill: Capillary refill takes less than 2 seconds.      Findings: No bruising. "   Neurological:      General: No focal deficit present.      Mental Status: He is alert and oriented to person, place, and time.      Cranial Nerves: No cranial nerve deficit.      Sensory: No sensory deficit.      Motor: No weakness.      Coordination: Coordination normal.      Gait: Gait normal.      Deep Tendon Reflexes: Reflexes normal.   Psychiatric:         Mood and Affect: Mood normal.         Behavior: Behavior normal.         Thought Content: Thought content normal.         Judgment: Judgment normal.       No visits with results within 1 Year(s) from this visit.   Latest known visit with results is:   Legacy Encounter on 04/14/2022   Component Date Value Ref Range Status    Prostate Specific Antigen,Screen 04/14/2022 2.29  0.00 - 4.00 ng/mL Final    Comment: The FDA requires that the method used for PSA assay be   reported to the physician. Values obtained with different   assay methods must not be used interchangeably. This test   was performed at [facility] using the Siemens  Handy PSA method, which is a sandwich immunoassay using   chemiluminescence for quantitation. The assay is approved  for measurement of prostate-specific antigen (PSA) in   serum and may be used in conjunction with a digital rectal  examination in men 50 years and older as an aid in   detection of prostate cancer.   5-Alpha-reductase inhibitors (e.g. Proscar, Finasteride,   Avodart, Dutasteride and Cortney) for the treatment of BPH   have been shown to lower PSA levels by an average of 50%   after 6 months of treatment.      CRP 04/14/2022 0.35  mg/dL Final    Comment: REF VALUE  < 1.00      Cholesterol 04/14/2022 268 (H)  0 - 199 mg/dL Final    Comment: .      AGE      DESIRABLE   BORDERLINE HIGH   HIGH     0-19 Y     0 - 169       170 - 199     >/= 200    20-24 Y     0 - 189       190 - 224     >/= 225         >24 Y     0 - 199       200 - 239     >/= 240   **All ranges are based on fasting samples. Specific    therapeutic targets will vary based on patient-specific   cardiac risk.  .   Pediatric guidelines reference:Pediatrics 2011, 128(S5).   Adult guidelines reference: NCEP ATPIII Guidelines,     CHIP 2001, 258:2486-97  .   Venipuncture immediately after or during the    administration of Metamizole may lead to falsely   low results. Testing should be performed immediately   prior to Metamizole dosing.      HDL 04/14/2022 43.5  mg/dL Final    Comment: .      AGE      VERY LOW   LOW     NORMAL    HIGH       0-19 Y       < 35   < 40     40-45     ----    20-24 Y       ----   < 40       >45     ----      >24 Y       ----   < 40     40-60      >60  .      Cholesterol/HDL Ratio 04/14/2022 6.2 (A)   Final    Comment: REF VALUES  DESIRABLE  < 3.4  HIGH RISK  > 5.0      LDL 04/14/2022 176 (H)  0 - 99 mg/dL Final    Comment: .                           NEAR      BORD      AGE      DESIRABLE  OPTIMAL    HIGH     HIGH     VERY HIGH     0-19 Y     0 - 109     ---    110-129   >/= 130     ----    20-24 Y     0 - 119     ---    120-159   >/= 160     ----      >24 Y     0 -  99   100-129  130-159   160-189     >/=190  .      VLDL 04/14/2022 48 (H)  0 - 40 mg/dL Final    Triglycerides 04/14/2022 242 (H)  0 - 149 mg/dL Final    Comment: .      AGE      DESIRABLE   BORDERLINE HIGH   HIGH     VERY HIGH   0 D-90 D    19 - 174         ----         ----        ----  91 D- 9 Y     0 -  74        75 -  99     >/= 100      ----    10-19 Y     0 -  89        90 - 129     >/= 130      ----    20-24 Y     0 - 114       115 - 149     >/= 150      ----         >24 Y     0 - 149       150 - 199    200- 499    >/= 500  .   Venipuncture immediately after or during the    administration of Metamizole may lead to falsely   low results. Testing should be performed immediately   prior to Metamizole dosing.      Non HDL Cholesterol 04/14/2022 225  mg/dL Final    Comment:     AGE      DESIRABLE   BORDERLINE HIGH   HIGH     VERY HIGH     0-19 Y     0 - 119        120 - 144     >/= 145    >/= 160    20-24 Y     0 - 149       150 - 189     >/= 190      ----         >24 Y    30 MG/DL ABOVE LDL CHOLESTEROL GOAL  .      WBC 04/14/2022 6.6  4.4 - 11.3 x10E9/L Final    nRBC 04/14/2022 0.0  0.0 - 0.0 /100 WBC Final    RBC 04/14/2022 5.22  4.50 - 5.90 x10E12/L Final    Hemoglobin 04/14/2022 16.0  13.5 - 17.5 g/dL Final    Hematocrit 04/14/2022 47.4  41.0 - 52.0 % Final    MCV 04/14/2022 91  80 - 100 fL Final    MCHC 04/14/2022 33.8  32.0 - 36.0 g/dL Final    Platelets 04/14/2022 175  150 - 450 x10E9/L Final    RDW 04/14/2022 13.2  11.5 - 14.5 % Final    Neutrophils % 04/14/2022 62.5  40.0 - 80.0 % Final    Immature Granulocytes %, Automated 04/14/2022 0.3  0.0 - 0.9 % Final    Comment:  Immature Granulocyte Count (IG) includes promyelocytes,    myelocytes and metamyelocytes but does not include bands.   Percent differential counts (%) should be interpreted in the   context of the absolute cell counts (cells/L).      Lymphocytes % 04/14/2022 25.8  13.0 - 44.0 % Final    Monocytes % 04/14/2022 7.5  2.0 - 10.0 % Final    Eosinophils % 04/14/2022 3.1  0.0 - 6.0 % Final    Basophils % 04/14/2022 0.8  0.0 - 2.0 % Final    Neutrophils Absolute 04/14/2022 4.10  1.60 - 5.50 x10E9/L Final    Lymphocytes Absolute 04/14/2022 1.69  0.80 - 3.00 x10E9/L Final    Monocytes Absolute 04/14/2022 0.49  0.05 - 0.80 x10E9/L Final    Eosinophils Absolute 04/14/2022 0.20  0.00 - 0.40 x10E9/L Final    Basophils Absolute 04/14/2022 0.05  0.00 - 0.10 x10E9/L Final    Glucose 04/14/2022 102 (H)  74 - 99 mg/dL Final    Sodium 04/14/2022 140  136 - 145 mmol/L Final    Potassium 04/14/2022 4.1  3.5 - 5.3 mmol/L Final    Chloride 04/14/2022 103  98 - 107 mmol/L Final    Bicarbonate 04/14/2022 29  21 - 32 mmol/L Final    Anion Gap 04/14/2022 12  10 - 20 mmol/L Final    Urea Nitrogen 04/14/2022 16  6 - 23 mg/dL Final    Creatinine 04/14/2022 1.23  0.50 - 1.30 mg/dL Final    GFR MALE 04/14/2022 62  >90  mL/min/1.73m2 Final    Comment:  CALCULATIONS OF ESTIMATED GFR ARE PERFORMED   USING THE 2021 CKD-EPI STUDY REFIT EQUATION   WITHOUT THE RACE VARIABLE FOR THE IDMS-TRACEABLE   CREATININE METHODS.    https://jasn.asnjournals.org/content/early/2021/09/22/ASN.3665974546      Calcium 04/14/2022 9.2  8.6 - 10.6 mg/dL Final    Albumin 04/14/2022 4.2  3.4 - 5.0 g/dL Final    Alkaline Phosphatase 04/14/2022 71  33 - 136 U/L Final    Total Protein 04/14/2022 6.8  6.4 - 8.2 g/dL Final    AST 04/14/2022 22  9 - 39 U/L Final    Total Bilirubin 04/14/2022 0.9  0.0 - 1.2 mg/dL Final    ALT (SGPT) 04/14/2022 44  10 - 52 U/L Final    Comment:  Patients treated with Sulfasalazine may generate    falsely decreased results for ALT.      TSH 04/14/2022 3.50  0.44 - 3.98 mIU/L Final    Comment:  TSH testing is performed using different testing    methodology at [facility] than at other    [facility] hospitals. Direct result comparisons should    only be made within the same method.       Medications Ordered Prior to Encounter[1]  No images are attached to the encounter.            Assessment/Plan   Problem List Items Addressed This Visit           ICD-10-CM    Epigastric pain R10.13    This has resolved and doing well         RESOLVED: Gastroesophageal reflux disease K21.9    Duodenitis K29.80    Much improved with steroid therapy and PPI         Encounter for annual wellness visit (AWV) in Medicare patient - Primary Z00.00    Relevant Orders    Lipid panel    Prostate Spec.Ag,Screen    TSH    Comprehensive metabolic panel    Tobacco dependency F17.200    Question about discontinuing smoking.  Down to 2 to 3 cigarettes daily please continue         Relevant Orders    CT lung screening low dose    Atrial flutter (Multi) I48.92    Following up with Dr. Sheridan was notified we will make appointment to be seen.  Patient is aware of the findings and concerns about this.         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    Treated by  GI specialist also got a follow-up for his colonoscopy         Sprain of anterior talofibular ligament S93.499A     Other Visit Diagnoses         Codes      Routine general medical examination at health care facility     Z00.00    Relevant Orders    1 Year Follow Up In [facility] - PCP - Wellness Exam      Advance directive discussed with patient [Z71.89]     Z71.89      Current smoker     F17.200    Relevant Orders    CT lung screening low dose      Cigarette nicotine dependence without complication     F17.210    Relevant Orders    CT lung screening low dose             Tobacco Counseling  The patient smokes cigarettes and desires to quit.  We created the following quit plan:  Quit assistance referrals: gave quit line number (1-800-QUIT-NOW).  Answers submitted by the patient for this visit:  Lower Extremity Injury Questionnaire (Submitted on 5/3/2025)  Chief Complaint: Lower extremity pain  Incident occurred: 5 to 7 days ago  [place]  Injury mechanism: an eversion injury, a twisting injury  Pain location: right leg, right ankle, right foot  Pain quality: aching  Pain - numeric: 5/10  Pain course: fluctuating  tingling: No  inability to bear weight: No  loss of motion: Yes  Foreign body present: no foreign bodies         [1]   Current Outpatient Medications on File Prior to Visit   Medication Sig Dispense Refill    losartan (Cozaar) 25 mg tablet Take 1 tablet (25 mg) by mouth early in the morning..      pantoprazole (ProtoNix) 40 mg EC tablet Take 1 tablet (40 mg) by mouth twice a day.      predniSONE (Deltasone) 20 mg tablet Take by mouth.       No current facility-administered medications on file prior to visit.

## 2025-05-08 NOTE — PATIENT INSTRUCTIONS
Quitting Smoking    Quitting smoking is the most important step you can take to improve your health. We're glad you have set a goal to improve your health.    Quit Smoking Resources    In addition to medications, use the STAR plan to help you successfully quit.   Stick with your quit date!   Tell friends, family, and coworkers your quit date. Request their understanding and support.  Anticipate and prepare for challenges. Some examples are withdrawal symptoms, being around others who smoke, and drinking alcohol.  Remove all tobacco products and paraphernalia from your environment. Make your home and vehicles smoke-free.    Free resources for additional support:  National tobacco quitline: 1-800-QUIT-NOW (1-305.578.4938).  SmokefreeTXT is a free text program to assist you in quitting. Visit https://www.smokefree.gov/smokefreetxt for more information.  Feel free to call your care manager at ([phone]) for additional support.    Ordering low-dose CT of the chest because of smoking.    Labs are going to be performed including lipids, TSH, PSA    Very important follow-up with the GI specialist you are due to have your colon cancer screening performed because of the follow-up that you are going to have it is very important to do your colonoscopy.    We had a lengthy discussion about this once again    Going to have you see dermatology specialist Dr. Barnett for routine care.    Expect continued improvement with the ankle.  If any troubles with weakness or other change please let me know recommending wearing the brace on uneven surfaces.  May ice area 20 minutes 3 times daily    Medications reviewed and reconciled labs have been reviewed.

## 2025-05-08 NOTE — ASSESSMENT & PLAN NOTE
Following up with Dr. Sheridan was notified we will make appointment to be seen.  Patient is aware of the findings and concerns about this.  
Much improved with steroid therapy and PPI  
Question about discontinuing smoking.  Down to 2 to 3 cigarettes daily please continue  
This has resolved and doing well  
Treated by GI specialist also got a follow-up for his colonoscopy  
HSQ

## 2025-05-29 ENCOUNTER — HOSPITAL ENCOUNTER (OUTPATIENT)
Dept: RADIOLOGY | Facility: CLINIC | Age: 75
Discharge: HOME | End: 2025-05-29
Payer: MEDICARE

## 2025-05-29 DIAGNOSIS — F17.210 CIGARETTE NICOTINE DEPENDENCE WITHOUT COMPLICATION: ICD-10-CM

## 2025-05-29 DIAGNOSIS — F17.200 CURRENT SMOKER: ICD-10-CM

## 2025-05-29 DIAGNOSIS — F17.200 TOBACCO DEPENDENCY: ICD-10-CM

## 2025-05-29 PROCEDURE — 71271 CT THORAX LUNG CANCER SCR C-: CPT

## 2025-05-30 DIAGNOSIS — K21.00 GASTROESOPHAGEAL REFLUX DISEASE WITH ESOPHAGITIS, UNSPECIFIED WHETHER HEMORRHAGE: ICD-10-CM

## 2025-05-30 DIAGNOSIS — I10 PRIMARY HYPERTENSION: ICD-10-CM

## 2025-05-30 DIAGNOSIS — I10 PRIMARY HYPERTENSION: Primary | ICD-10-CM

## 2025-05-30 RX ORDER — PANTOPRAZOLE SODIUM 40 MG/1
40 TABLET, DELAYED RELEASE ORAL EVERY 12 HOURS SCHEDULED
Qty: 60 TABLET | Refills: 1 | Status: SHIPPED | OUTPATIENT
Start: 2025-05-30

## 2025-05-30 RX ORDER — LOSARTAN POTASSIUM 25 MG/1
25 TABLET ORAL
Qty: 90 TABLET | Refills: 3 | Status: SHIPPED | OUTPATIENT
Start: 2025-05-30

## 2025-06-02 RX ORDER — PANTOPRAZOLE SODIUM 40 MG/1
40 TABLET, DELAYED RELEASE ORAL
Qty: 90 TABLET | Refills: 1 | Status: SHIPPED | OUTPATIENT
Start: 2025-06-02

## 2025-06-02 RX ORDER — LOSARTAN POTASSIUM 25 MG/1
25 TABLET ORAL
Qty: 90 TABLET | Refills: 1 | Status: SHIPPED | OUTPATIENT
Start: 2025-06-02

## 2025-07-04 LAB
ALBUMIN SERPL-MCNC: 4.6 G/DL (ref 3.6–5.1)
ALP SERPL-CCNC: 77 U/L (ref 35–144)
ALT SERPL-CCNC: 16 U/L (ref 9–46)
ANION GAP SERPL CALCULATED.4IONS-SCNC: 10 MMOL/L (CALC) (ref 7–17)
AST SERPL-CCNC: 13 U/L (ref 10–35)
BILIRUB SERPL-MCNC: 1 MG/DL (ref 0.2–1.2)
BUN SERPL-MCNC: 23 MG/DL (ref 7–25)
CALCIUM SERPL-MCNC: 9.1 MG/DL (ref 8.6–10.3)
CHLORIDE SERPL-SCNC: 103 MMOL/L (ref 98–110)
CHOLEST SERPL-MCNC: 249 MG/DL
CHOLEST/HDLC SERPL: 4.9 (CALC)
CO2 SERPL-SCNC: 28 MMOL/L (ref 20–32)
CREAT SERPL-MCNC: 1.55 MG/DL (ref 0.7–1.28)
EGFRCR SERPLBLD CKD-EPI 2021: 46 ML/MIN/1.73M2
GLUCOSE SERPL-MCNC: 110 MG/DL (ref 65–99)
HDLC SERPL-MCNC: 51 MG/DL
LDLC SERPL CALC-MCNC: 167 MG/DL (CALC)
NONHDLC SERPL-MCNC: 198 MG/DL (CALC)
POTASSIUM SERPL-SCNC: 4 MMOL/L (ref 3.5–5.3)
PROT SERPL-MCNC: 6.8 G/DL (ref 6.1–8.1)
PSA SERPL-MCNC: 4.25 NG/ML
SODIUM SERPL-SCNC: 141 MMOL/L (ref 135–146)
TRIGL SERPL-MCNC: 165 MG/DL
TSH SERPL-ACNC: 2.32 MIU/L (ref 0.4–4.5)

## 2025-07-07 DIAGNOSIS — R79.89 ELEVATED SERUM CREATININE: ICD-10-CM

## 2025-08-04 ENCOUNTER — APPOINTMENT (OUTPATIENT)
Dept: GASTROENTEROLOGY | Facility: CLINIC | Age: 75
End: 2025-08-04
Payer: MEDICARE

## 2025-10-20 ENCOUNTER — APPOINTMENT (OUTPATIENT)
Dept: PRIMARY CARE | Facility: CLINIC | Age: 75
End: 2025-10-20
Payer: MEDICARE